# Patient Record
Sex: MALE | Race: WHITE | NOT HISPANIC OR LATINO
[De-identification: names, ages, dates, MRNs, and addresses within clinical notes are randomized per-mention and may not be internally consistent; named-entity substitution may affect disease eponyms.]

---

## 2019-12-24 ENCOUNTER — APPOINTMENT (OUTPATIENT)
Dept: RADIATION ONCOLOGY | Facility: CLINIC | Age: 74
End: 2019-12-24

## 2020-02-10 ENCOUNTER — APPOINTMENT (OUTPATIENT)
Dept: RADIATION ONCOLOGY | Facility: CLINIC | Age: 75
End: 2020-02-10
Payer: MEDICARE

## 2020-02-10 VITALS
WEIGHT: 180 LBS | BODY MASS INDEX: 28.93 KG/M2 | TEMPERATURE: 97.7 F | DIASTOLIC BLOOD PRESSURE: 95 MMHG | HEIGHT: 66 IN | SYSTOLIC BLOOD PRESSURE: 171 MMHG | OXYGEN SATURATION: 98 % | RESPIRATION RATE: 12 BRPM | HEART RATE: 65 BPM

## 2020-02-10 DIAGNOSIS — C61 MALIGNANT NEOPLASM OF PROSTATE: ICD-10-CM

## 2020-02-10 PROBLEM — Z00.00 ENCOUNTER FOR PREVENTIVE HEALTH EXAMINATION: Status: ACTIVE | Noted: 2020-02-10

## 2020-02-10 PROCEDURE — 99214 OFFICE O/P EST MOD 30 MIN: CPT

## 2020-02-10 RX ORDER — LOSARTAN POTASSIUM 100 MG/1
100 TABLET, FILM COATED ORAL
Refills: 0 | Status: ACTIVE | COMMUNITY

## 2020-02-10 RX ORDER — MELATONIN/PYRIDOXINE HCL (B6) 5 MG-10 MG
TABLET,IMMED, EXTENDED RELEASE, BIPHASIC ORAL
Refills: 0 | Status: ACTIVE | COMMUNITY

## 2020-02-10 RX ORDER — CALCIUM CARBONATE 600 MG
TABLET ORAL
Refills: 0 | Status: ACTIVE | COMMUNITY

## 2020-02-10 RX ORDER — ATORVASTATIN CALCIUM 10 MG/1
10 TABLET, FILM COATED ORAL
Refills: 0 | Status: ACTIVE | COMMUNITY

## 2020-02-10 RX ORDER — OMEPRAZOLE 20 MG/1
20 CAPSULE, DELAYED RELEASE ORAL
Refills: 0 | Status: ACTIVE | COMMUNITY

## 2020-02-11 NOTE — HISTORY OF PRESENT ILLNESS
[FreeTextEntry1] : Since last being seen in our office for routine follow-up in March 2019, Mr. marsh reports that he continues to have his PSAs monitored at the [facility].  He continues to see Dr. Choudhury routinely for follow-up.  He reports that he has no new genitourinary or gastrointestinal complaints.  He reports no back pain, no bone pain, no anorexia, weight loss or fatigue.  He mentions that he will be traveling to [place] for a tour later this month which she is quite excited about.\par \par EPIC–CP score: 11\par Prostate cancer scoring sheet for urinary activities: 2

## 2020-02-11 NOTE — DISEASE MANAGEMENT
[Pathological] : TNM Stage: p [III] : III [FreeTextEntry4] : Adenocarcinoma the prostate status post prostatectomy with a Riccardo of 9 with a positive margin postop PSA 0.1 [MTNM] : n/a [NTNM] : n/a [TTNM] : n/a [de-identified] : 4500 cGy to the prostate fossa pelvic lymph nodes completed on 8/21/2015 with a boost of 2340 cGy to the prostate fossa completed on 9/10/2015 making the total dose to the prostate fossa a 6040 cGy

## 2020-02-11 NOTE — PHYSICAL EXAM
[Normal] : normoactive bowel sounds, soft and nontender, no hepatosplenomegaly or masses appreciated [Normal Sphincter Tone] : normal sphincter tone [No Prostate Nodules] : no prostate nodules [No Rectal Mass] : no rectal mass [Prostate Not Tender] : prostate not tender [External Hemorrhoid] : no external hemorrhoids [Prostate Not Enlarge] : prostate not enlarged [Internal Hemorrhoid] : no internal hemorrhoids [Blood on examination glove] : no blood on examination glove [de-identified] : No suprapubic distention or tenderness.  No CVA tenderness. [de-identified] : No spinal tenderness.

## 2020-09-10 ENCOUNTER — APPOINTMENT (OUTPATIENT)
Dept: RADIATION ONCOLOGY | Facility: CLINIC | Age: 75
End: 2020-09-10